# Patient Record
Sex: MALE | Race: WHITE | Employment: STUDENT | URBAN - METROPOLITAN AREA
[De-identification: names, ages, dates, MRNs, and addresses within clinical notes are randomized per-mention and may not be internally consistent; named-entity substitution may affect disease eponyms.]

---

## 2024-02-09 ENCOUNTER — OFFICE VISIT (OUTPATIENT)
Dept: URGENT CARE | Age: 24
End: 2024-02-09
Payer: COMMERCIAL

## 2024-02-09 ENCOUNTER — HOSPITAL ENCOUNTER (OUTPATIENT)
Facility: HOSPITAL | Age: 24
Setting detail: OBSERVATION
Discharge: HOME | End: 2024-02-10
Attending: EMERGENCY MEDICINE | Admitting: INTERNAL MEDICINE
Payer: COMMERCIAL

## 2024-02-09 VITALS
TEMPERATURE: 98.5 F | HEART RATE: 130 BPM | WEIGHT: 196 LBS | OXYGEN SATURATION: 98 % | DIASTOLIC BLOOD PRESSURE: 110 MMHG | SYSTOLIC BLOOD PRESSURE: 154 MMHG | RESPIRATION RATE: 20 BRPM

## 2024-02-09 DIAGNOSIS — F10.930 ALCOHOL WITHDRAWAL SYNDROME WITHOUT COMPLICATION (HCC): Primary | ICD-10-CM

## 2024-02-09 DIAGNOSIS — F10.939 ALCOHOL WITHDRAWAL (HCC): Primary | ICD-10-CM

## 2024-02-09 DIAGNOSIS — F10.930 ALCOHOL WITHDRAWAL SYNDROME WITHOUT COMPLICATION (HCC): ICD-10-CM

## 2024-02-09 LAB
ALBUMIN SERPL BCP-MCNC: 5.5 G/DL (ref 3.5–5)
ALP SERPL-CCNC: 101 U/L (ref 34–104)
ALT SERPL W P-5'-P-CCNC: 64 U/L (ref 7–52)
ANION GAP SERPL CALCULATED.3IONS-SCNC: 13 MMOL/L
AST SERPL W P-5'-P-CCNC: 44 U/L (ref 13–39)
BASOPHILS # BLD AUTO: 0.03 THOUSANDS/ÂΜL (ref 0–0.1)
BASOPHILS NFR BLD AUTO: 0 % (ref 0–1)
BILIRUB SERPL-MCNC: 0.95 MG/DL (ref 0.2–1)
BUN SERPL-MCNC: 6 MG/DL (ref 5–25)
CALCIUM SERPL-MCNC: 10.2 MG/DL (ref 8.4–10.2)
CHLORIDE SERPL-SCNC: 100 MMOL/L (ref 96–108)
CO2 SERPL-SCNC: 22 MMOL/L (ref 21–32)
CREAT SERPL-MCNC: 0.9 MG/DL (ref 0.6–1.3)
EOSINOPHIL # BLD AUTO: 0.01 THOUSAND/ÂΜL (ref 0–0.61)
EOSINOPHIL NFR BLD AUTO: 0 % (ref 0–6)
ERYTHROCYTE [DISTWIDTH] IN BLOOD BY AUTOMATED COUNT: 11.7 % (ref 11.6–15.1)
GFR SERPL CREATININE-BSD FRML MDRD: 120 ML/MIN/1.73SQ M
GLUCOSE SERPL-MCNC: 109 MG/DL (ref 65–140)
HCT VFR BLD AUTO: 47.5 % (ref 36.5–49.3)
HGB BLD-MCNC: 16.7 G/DL (ref 12–17)
IMM GRANULOCYTES # BLD AUTO: 0.03 THOUSAND/UL (ref 0–0.2)
IMM GRANULOCYTES NFR BLD AUTO: 0 % (ref 0–2)
LIPASE SERPL-CCNC: 17 U/L (ref 11–82)
LYMPHOCYTES # BLD AUTO: 1.04 THOUSANDS/ÂΜL (ref 0.6–4.47)
LYMPHOCYTES NFR BLD AUTO: 11 % (ref 14–44)
MCH RBC QN AUTO: 33.3 PG (ref 26.8–34.3)
MCHC RBC AUTO-ENTMCNC: 35.2 G/DL (ref 31.4–37.4)
MCV RBC AUTO: 95 FL (ref 82–98)
MONOCYTES # BLD AUTO: 0.82 THOUSAND/ÂΜL (ref 0.17–1.22)
MONOCYTES NFR BLD AUTO: 9 % (ref 4–12)
NEUTROPHILS # BLD AUTO: 7.46 THOUSANDS/ÂΜL (ref 1.85–7.62)
NEUTS SEG NFR BLD AUTO: 80 % (ref 43–75)
NRBC BLD AUTO-RTO: 0 /100 WBCS
PLATELET # BLD AUTO: 344 THOUSANDS/UL (ref 149–390)
PMV BLD AUTO: 8.8 FL (ref 8.9–12.7)
POTASSIUM SERPL-SCNC: 3.5 MMOL/L (ref 3.5–5.3)
PROT SERPL-MCNC: 8.5 G/DL (ref 6.4–8.4)
RBC # BLD AUTO: 5.02 MILLION/UL (ref 3.88–5.62)
SODIUM SERPL-SCNC: 135 MMOL/L (ref 135–147)
WBC # BLD AUTO: 9.39 THOUSAND/UL (ref 4.31–10.16)

## 2024-02-09 PROCEDURE — 99285 EMERGENCY DEPT VISIT HI MDM: CPT | Performed by: EMERGENCY MEDICINE

## 2024-02-09 PROCEDURE — 96365 THER/PROPH/DIAG IV INF INIT: CPT

## 2024-02-09 PROCEDURE — 83690 ASSAY OF LIPASE: CPT | Performed by: EMERGENCY MEDICINE

## 2024-02-09 PROCEDURE — 99284 EMERGENCY DEPT VISIT MOD MDM: CPT

## 2024-02-09 PROCEDURE — 93005 ELECTROCARDIOGRAM TRACING: CPT

## 2024-02-09 PROCEDURE — 36415 COLL VENOUS BLD VENIPUNCTURE: CPT

## 2024-02-09 PROCEDURE — 99213 OFFICE O/P EST LOW 20 MIN: CPT

## 2024-02-09 PROCEDURE — 85025 COMPLETE CBC W/AUTO DIFF WBC: CPT | Performed by: EMERGENCY MEDICINE

## 2024-02-09 PROCEDURE — 80053 COMPREHEN METABOLIC PANEL: CPT | Performed by: EMERGENCY MEDICINE

## 2024-02-09 RX ORDER — DIAZEPAM 5 MG/1
10 TABLET ORAL ONCE
Status: COMPLETED | OUTPATIENT
Start: 2024-02-09 | End: 2024-02-09

## 2024-02-09 RX ORDER — PHENOBARBITAL SODIUM 65 MG/ML
130 INJECTION, SOLUTION INTRAMUSCULAR; INTRAVENOUS ONCE
Status: COMPLETED | OUTPATIENT
Start: 2024-02-10 | End: 2024-02-10

## 2024-02-09 RX ADMIN — PHENOBARBITAL SODIUM 260 MG: 65 INJECTION INTRAMUSCULAR at 23:06

## 2024-02-09 RX ADMIN — DIAZEPAM 10 MG: 5 TABLET ORAL at 23:07

## 2024-02-10 VITALS
RESPIRATION RATE: 16 BRPM | OXYGEN SATURATION: 97 % | DIASTOLIC BLOOD PRESSURE: 85 MMHG | HEART RATE: 70 BPM | TEMPERATURE: 97.9 F | SYSTOLIC BLOOD PRESSURE: 134 MMHG

## 2024-02-10 PROBLEM — R74.01 TRANSAMINITIS: Status: ACTIVE | Noted: 2024-02-10

## 2024-02-10 PROBLEM — F10.939 ALCOHOL WITHDRAWAL (HCC): Status: ACTIVE | Noted: 2024-02-10

## 2024-02-10 PROBLEM — R74.01 TRANSAMINITIS: Status: RESOLVED | Noted: 2024-02-10 | Resolved: 2024-02-10

## 2024-02-10 PROBLEM — F10.939 ALCOHOL WITHDRAWAL (HCC): Status: RESOLVED | Noted: 2024-02-10 | Resolved: 2024-02-10

## 2024-02-10 LAB
ALBUMIN SERPL BCP-MCNC: 4.4 G/DL (ref 3.5–5)
ALP SERPL-CCNC: 79 U/L (ref 34–104)
ALT SERPL W P-5'-P-CCNC: 48 U/L (ref 7–52)
ANION GAP SERPL CALCULATED.3IONS-SCNC: 8 MMOL/L
AST SERPL W P-5'-P-CCNC: 34 U/L (ref 13–39)
BASOPHILS # BLD AUTO: 0.03 THOUSANDS/ÂΜL (ref 0–0.1)
BASOPHILS NFR BLD AUTO: 0 % (ref 0–1)
BILIRUB SERPL-MCNC: 1.22 MG/DL (ref 0.2–1)
BUN SERPL-MCNC: 5 MG/DL (ref 5–25)
CALCIUM SERPL-MCNC: 8.8 MG/DL (ref 8.4–10.2)
CHLORIDE SERPL-SCNC: 101 MMOL/L (ref 96–108)
CO2 SERPL-SCNC: 27 MMOL/L (ref 21–32)
CREAT SERPL-MCNC: 0.88 MG/DL (ref 0.6–1.3)
EOSINOPHIL # BLD AUTO: 0.04 THOUSAND/ÂΜL (ref 0–0.61)
EOSINOPHIL NFR BLD AUTO: 1 % (ref 0–6)
ERYTHROCYTE [DISTWIDTH] IN BLOOD BY AUTOMATED COUNT: 11.8 % (ref 11.6–15.1)
GFR SERPL CREATININE-BSD FRML MDRD: 121 ML/MIN/1.73SQ M
GLUCOSE P FAST SERPL-MCNC: 97 MG/DL (ref 65–99)
GLUCOSE SERPL-MCNC: 97 MG/DL (ref 65–140)
HAV IGM SER QL: NORMAL
HBV CORE IGM SER QL: NORMAL
HBV SURFACE AG SER QL: NORMAL
HCT VFR BLD AUTO: 42.4 % (ref 36.5–49.3)
HCV AB SER QL: NORMAL
HGB BLD-MCNC: 14.4 G/DL (ref 12–17)
IMM GRANULOCYTES # BLD AUTO: 0.02 THOUSAND/UL (ref 0–0.2)
IMM GRANULOCYTES NFR BLD AUTO: 0 % (ref 0–2)
LYMPHOCYTES # BLD AUTO: 1.84 THOUSANDS/ÂΜL (ref 0.6–4.47)
LYMPHOCYTES NFR BLD AUTO: 27 % (ref 14–44)
MCH RBC QN AUTO: 32.7 PG (ref 26.8–34.3)
MCHC RBC AUTO-ENTMCNC: 34 G/DL (ref 31.4–37.4)
MCV RBC AUTO: 96 FL (ref 82–98)
MONOCYTES # BLD AUTO: 0.87 THOUSAND/ÂΜL (ref 0.17–1.22)
MONOCYTES NFR BLD AUTO: 13 % (ref 4–12)
NEUTROPHILS # BLD AUTO: 4.15 THOUSANDS/ÂΜL (ref 1.85–7.62)
NEUTS SEG NFR BLD AUTO: 59 % (ref 43–75)
NRBC BLD AUTO-RTO: 0 /100 WBCS
PLATELET # BLD AUTO: 290 THOUSANDS/UL (ref 149–390)
PMV BLD AUTO: 9 FL (ref 8.9–12.7)
POTASSIUM SERPL-SCNC: 3.4 MMOL/L (ref 3.5–5.3)
PROT SERPL-MCNC: 6.6 G/DL (ref 6.4–8.4)
RBC # BLD AUTO: 4.41 MILLION/UL (ref 3.88–5.62)
SODIUM SERPL-SCNC: 136 MMOL/L (ref 135–147)
WBC # BLD AUTO: 6.95 THOUSAND/UL (ref 4.31–10.16)

## 2024-02-10 PROCEDURE — 80053 COMPREHEN METABOLIC PANEL: CPT | Performed by: PHYSICIAN ASSISTANT

## 2024-02-10 PROCEDURE — 85025 COMPLETE CBC W/AUTO DIFF WBC: CPT | Performed by: PHYSICIAN ASSISTANT

## 2024-02-10 PROCEDURE — 80074 ACUTE HEPATITIS PANEL: CPT | Performed by: PHYSICIAN ASSISTANT

## 2024-02-10 PROCEDURE — 96376 TX/PRO/DX INJ SAME DRUG ADON: CPT

## 2024-02-10 PROCEDURE — 99236 HOSP IP/OBS SAME DATE HI 85: CPT | Performed by: FAMILY MEDICINE

## 2024-02-10 RX ORDER — CHLORDIAZEPOXIDE HYDROCHLORIDE 5 MG/1
CAPSULE, GELATIN COATED ORAL 3 TIMES DAILY PRN
Qty: 6 CAPSULE | Refills: 0 | Status: SHIPPED | OUTPATIENT
Start: 2024-02-10 | End: 2024-02-10

## 2024-02-10 RX ORDER — LANOLIN ALCOHOL/MO/W.PET/CERES
100 CREAM (GRAM) TOPICAL DAILY
Status: DISCONTINUED | OUTPATIENT
Start: 2024-02-10 | End: 2024-02-10 | Stop reason: HOSPADM

## 2024-02-10 RX ORDER — SODIUM CHLORIDE, SODIUM GLUCONATE, SODIUM ACETATE, POTASSIUM CHLORIDE, MAGNESIUM CHLORIDE, SODIUM PHOSPHATE, DIBASIC, AND POTASSIUM PHOSPHATE .53; .5; .37; .037; .03; .012; .00082 G/100ML; G/100ML; G/100ML; G/100ML; G/100ML; G/100ML; G/100ML
100 INJECTION, SOLUTION INTRAVENOUS CONTINUOUS
Status: DISCONTINUED | OUTPATIENT
Start: 2024-02-10 | End: 2024-02-10 | Stop reason: HOSPADM

## 2024-02-10 RX ORDER — LANOLIN ALCOHOL/MO/W.PET/CERES
100 CREAM (GRAM) TOPICAL DAILY
COMMUNITY
Start: 2024-02-11 | End: 2024-02-25

## 2024-02-10 RX ORDER — POTASSIUM CHLORIDE 20 MEQ/1
40 TABLET, EXTENDED RELEASE ORAL ONCE
Status: COMPLETED | OUTPATIENT
Start: 2024-02-10 | End: 2024-02-10

## 2024-02-10 RX ORDER — ONDANSETRON 2 MG/ML
4 INJECTION INTRAMUSCULAR; INTRAVENOUS EVERY 6 HOURS PRN
Status: DISCONTINUED | OUTPATIENT
Start: 2024-02-10 | End: 2024-02-10 | Stop reason: HOSPADM

## 2024-02-10 RX ORDER — ACETAMINOPHEN 325 MG/1
650 TABLET ORAL EVERY 6 HOURS PRN
Status: DISCONTINUED | OUTPATIENT
Start: 2024-02-10 | End: 2024-02-10 | Stop reason: HOSPADM

## 2024-02-10 RX ORDER — FOLIC ACID 1 MG/1
1 TABLET ORAL DAILY
Status: DISCONTINUED | OUTPATIENT
Start: 2024-02-10 | End: 2024-02-10 | Stop reason: HOSPADM

## 2024-02-10 RX ORDER — MULTIVITAMIN
1 CAPSULE ORAL DAILY
Start: 2024-02-10

## 2024-02-10 RX ORDER — CHLORDIAZEPOXIDE HYDROCHLORIDE 5 MG/1
CAPSULE, GELATIN COATED ORAL 3 TIMES DAILY PRN
Qty: 6 CAPSULE | Refills: 0 | Status: SHIPPED | OUTPATIENT
Start: 2024-02-10 | End: 2024-02-16 | Stop reason: ALTCHOICE

## 2024-02-10 RX ORDER — FOLIC ACID 1 MG/1
1 TABLET ORAL DAILY
COMMUNITY
Start: 2024-02-11 | End: 2024-02-25

## 2024-02-10 RX ADMIN — MULTIPLE VITAMINS W/ MINERALS TAB 1 TABLET: TAB ORAL at 08:29

## 2024-02-10 RX ADMIN — Medication 100 MG: at 08:29

## 2024-02-10 RX ADMIN — POTASSIUM CHLORIDE 40 MEQ: 1500 TABLET, EXTENDED RELEASE ORAL at 11:31

## 2024-02-10 RX ADMIN — SODIUM CHLORIDE, SODIUM GLUCONATE, SODIUM ACETATE, POTASSIUM CHLORIDE, MAGNESIUM CHLORIDE, SODIUM PHOSPHATE, DIBASIC, AND POTASSIUM PHOSPHATE 100 ML/HR: .53; .5; .37; .037; .03; .012; .00082 INJECTION, SOLUTION INTRAVENOUS at 02:02

## 2024-02-10 RX ADMIN — SODIUM CHLORIDE 1000 ML: 0.9 INJECTION, SOLUTION INTRAVENOUS at 00:21

## 2024-02-10 RX ADMIN — PHENOBARBITAL SODIUM 130 MG: 65 INJECTION INTRAMUSCULAR at 00:24

## 2024-02-10 RX ADMIN — FOLIC ACID 1 MG: 1 TABLET ORAL at 08:29

## 2024-02-10 NOTE — ASSESSMENT & PLAN NOTE
Mild transaminitis possibly 2/2 alcohol abuse   AST 44, ALT 64  Patient endorses intermittent RUQ abdominal pain x 3 days   Check RUQ US   Obtain hepatitis panel   Trend LFTs

## 2024-02-10 NOTE — ASSESSMENT & PLAN NOTE
Patient presents with tremors, HA, diaphoresis, anxiety after drinking 5 shots of liquor daily for the past 6-7 months   Last alcoholic beverage was 02/07  No prior hx of alcohol withdrawal or seizures   Noted to have tachycardia and hypertension in the ED -- now improving s/p Valium and Phenobarbital in the ED   Symptoms significantly improved.  Continue with the vitamin supplements as an outpatient.  Will do a short course of Librium as well on discharge.  Patient seems to be doing okay.  He said he will follow-up as an outpatient for rehab if he feels it is necessary

## 2024-02-10 NOTE — PROGRESS NOTES
[facility]        NAME: Dagoberto Uribe is a 23 y.o. male  : 2000    MRN: [id number]  DATE: 2024  TIME: 9:16 PM    Assessment and Plan   Alcohol withdrawal syndrome without complication (HCC) [F10.930]  1. Alcohol withdrawal syndrome without complication (HCC)  Transfer to other facility            Patient Instructions       Follow up with PCP in 3-5 days.  Proceed to  ER if symptoms worsen.    Chief Complaint     Chief Complaint   Patient presents with    Abdominal Pain     Abdominal pain RUQ and los of appetite started Tuesday. Patient states he is going through a withdrawal of alcohol Wednesday.          History of Present Illness       HPI    Review of Systems   Review of Systems      Current Medications     No current outpatient medications on file.    Current Allergies     Allergies as of 2024 - Reviewed 2024   Allergen Reaction Noted    Amoxicillin Hives 2024            The following portions of the patient's history were reviewed and updated as appropriate: allergies, current medications, past family history, past medical history, past social history, past surgical history and problem list.     History reviewed. No pertinent past medical history.    History reviewed. No pertinent surgical history.    History reviewed. No pertinent family history.      Medications have been verified.        Objective   BP (!) 154/110 (BP Location: Right arm) Comment (BP Location): manual  Pulse (!) 130   Temp 98.5 °F (36.9 °C) (Temporal)   Resp 20   Wt 88.9 kg (196 lb)   SpO2 98%        Physical Exam     Physical Exam                 list of the medicines, vitamins, and herbs you take. Include the amounts, and when and why you take them. Bring the list or the pill bottles to follow-up visits. Carry your medicine list with you in case of an emergency.     Have someone stay with you during withdrawal:  This person should help you take your medicine and keep you in a calm, quiet environment. He or she should also watch your symptoms and know what to do if your symptoms get worse.  Learn to stop drinking alcohol safely:  Work with your healthcare provider to develop a plan for you to stop drinking safely. A sudden stop or change can be life-threatening.  For support and more information:   Alcoholics Anonymous  Web Address: http://www.aa.org     Substance Abuse and Mental Health Services Administration  [address]  [place] [address]  Web Address: http://www.[email or web address].gov  Follow up with your healthcare provider within 1 day:  Write down your questions so you remember to ask them during your visits.  © Copyright [employer] 2023 Information is for End User's use only and may not be sold, redistributed or otherwise used for commercial purposes.  The above information is an  only. It is not intended as medical advice for individual conditions or treatments. Talk to your doctor, nurse or pharmacist before following any medical regimen to see if it is safe and effective for you.       Follow up with PCP in 3-5 days.  Proceed to  ER if symptoms worsen.    Chief Complaint     Chief Complaint   Patient presents with    Abdominal Pain     Abdominal pain RUQ and los of appetite started Tuesday. Patient states he is going through a withdrawal of alcohol Wednesday.          History of Present Illness       23 year old male with no significant PMH presents for evaluation of RUQ pain, tremors. He reports that he has been binge drinking over the past year, consuming approximately 5 mixed drinks per night. He woke up 2 days ago with RUQ discomfort  and discontinued drinking since; he has noticed fine tremors of the hands over the past 24 hours. He denies vomiting, fever, palpitations, hallucinations, seizure activity. Last drink was 2/07/24.         Review of Systems   Review of Systems   Constitutional:  Negative for fatigue and fever.   HENT:  Negative for congestion, ear discharge, ear pain, postnasal drip, rhinorrhea, sinus pressure, sinus pain, sneezing and sore throat.    Eyes: Negative.  Negative for pain, discharge, redness and itching.   Respiratory: Negative.  Negative for apnea, cough, choking, chest tightness, shortness of breath, wheezing and stridor.    Cardiovascular: Negative.  Negative for chest pain and palpitations.   Gastrointestinal:  Positive for abdominal pain. Negative for abdominal distention, anal bleeding, blood in stool, constipation, diarrhea, nausea, rectal pain and vomiting.   Endocrine: Negative.  Negative for polydipsia, polyphagia and polyuria.   Genitourinary: Negative.  Negative for decreased urine volume and flank pain.   Musculoskeletal: Negative.  Negative for arthralgias, back pain, myalgias, neck pain and neck stiffness.   Skin: Negative.  Negative for color change and rash.   Allergic/Immunologic: Negative.  Negative for environmental allergies.   Neurological:  Positive for tremors. Negative for dizziness, facial asymmetry, light-headedness, numbness and headaches.   Hematological: Negative.  Negative for adenopathy.   Psychiatric/Behavioral: Negative.           Current Medications     No current outpatient medications on file.    Current Allergies     Allergies as of 02/09/2024 - Reviewed 02/09/2024   Allergen Reaction Noted    Amoxicillin Hives 02/09/2024            The following portions of the patient's history were reviewed and updated as appropriate: allergies, current medications, past family history, past medical history, past social history, past surgical history and problem list.     History reviewed. No  pertinent past medical history.    History reviewed. No pertinent surgical history.    History reviewed. No pertinent family history.      Medications have been verified.        Objective   BP (!) 154/110 (BP Location: Right arm) Comment (BP Location): manual  Pulse (!) 130   Temp 98.5 °F (36.9 °C) (Temporal)   Resp 20   Wt 88.9 kg (196 lb)   SpO2 98%        Physical Exam     Physical Exam  Vitals reviewed.   Constitutional:       General: He is not in acute distress.     Appearance: Normal appearance. He is not ill-appearing, toxic-appearing or diaphoretic.      Interventions: He is not intubated.  HENT:      Head: Normocephalic and atraumatic.      Right Ear: Tympanic membrane, ear canal and external ear normal. There is no impacted cerumen.      Left Ear: Tympanic membrane, ear canal and external ear normal. There is no impacted cerumen.      Nose: Nose normal. No congestion or rhinorrhea.      Mouth/Throat:      Mouth: Mucous membranes are moist.      Pharynx: Oropharynx is clear. Uvula midline. No pharyngeal swelling, oropharyngeal exudate, posterior oropharyngeal erythema or uvula swelling.      Tonsils: No tonsillar exudate or tonsillar abscesses. 1+ on the right. 1+ on the left.   Eyes:      Extraocular Movements: Extraocular movements intact.      Conjunctiva/sclera: Conjunctivae normal.      Pupils: Pupils are equal, round, and reactive to light.      Comments: Anicteric   Cardiovascular:      Rate and Rhythm: Normal rate and regular rhythm.      Pulses: Normal pulses.      Heart sounds: Normal heart sounds, S1 normal and S2 normal. Heart sounds not distant. No murmur heard.     No friction rub. No gallop.   Pulmonary:      Effort: Pulmonary effort is normal. No tachypnea, bradypnea, accessory muscle usage, prolonged expiration, respiratory distress or retractions. He is not intubated.      Breath sounds: Normal breath sounds. No stridor, decreased air movement or transmitted upper airway sounds. No  decreased breath sounds, wheezing, rhonchi or rales.   Chest:      Chest wall: No tenderness.   Abdominal:      General: Abdomen is flat.      Palpations: Abdomen is soft.      Tenderness: There is no abdominal tenderness. There is no right CVA tenderness or left CVA tenderness.   Musculoskeletal:         General: Normal range of motion.      Cervical back: Normal range of motion and neck supple. No rigidity or tenderness.   Lymphadenopathy:      Cervical: No cervical adenopathy.   Skin:     General: Skin is warm and dry.      Capillary Refill: Capillary refill takes less than 2 seconds.      Findings: No erythema.   Neurological:      General: No focal deficit present.      Mental Status: He is alert.      GCS: GCS eye subscore is 4. GCS verbal subscore is 5. GCS motor subscore is 6.      Cranial Nerves: Cranial nerves 2-12 are intact.      Sensory: Sensation is intact.      Motor: Tremor present. No weakness, atrophy, abnormal muscle tone, seizure activity or pronator drift.      Coordination: Coordination is intact.      Gait: Gait is intact.      Comments: Fine intention tremor of hands.    Psychiatric:         Mood and Affect: Mood normal.

## 2024-02-10 NOTE — ED ATTENDING ATTESTATION
"2/9/2024  I, Peter Malik MD, saw and evaluated the patient. I have discussed the patient with the resident/non-physician practitioner and agree with the resident's/non-physician practitioner's findings, Plan of Care, and MDM as documented in the resident's/non-physician practitioner's note, except where noted. All available labs and Radiology studies were reviewed.  I was present for key portions of any procedure(s) performed by the resident/non-physician practitioner and I was immediately available to provide assistance.    At this point I agree with the current assessment done in the Emergency Department.  I have conducted an independent evaluation of this patient a history and physical is as follows:    This is a 23 y.o. old male who presents to the ED for evaluation of alcohol withdrawal. Last drink night of 2/7/23. Drinking \"5 vodka drinks 5x/wk\". Today shakey, sweaty, lightheaded, palpitations. He is cutting back on alcohol intentionally, has been on a heavy \"dumont\" no seizure activity.     VS and nursing notes reviewed  General: Appears in moderate distress, awake, alert, speaking normally in full sentences. Well-nourished, well-developed. Appears stated age.   Head: Normocephalic, atraumatic.  Eyes: EOMI. Vision grossly normal. No subconjunctival hemorrhages or occular discharge noted. Symmetrical lids. Pupils dilated.  ENT: Atraumatic external nose and ears. No stridor. Normal phonation. No drooling. Normal swallowing.   Neck: No JVD. FROM. No goiter.  CV: No pallor. Tachycardic rate.  Lungs: No tachypnea. No respiratory distress.  MSK: Moving all extremities equally, no peripheral edema  Skin: Dry, intact. No cyanosis. Skin Tachy.  Neuro: Awake, alert, GCS15. CN II-XII grossly intact. Grossly normal gait. Tremor x4  Psychiatric/Behavioral: Appropriate mood and affect.  Mildly anxious    A/P: This is a 23 y.o. male who presents to the ED for evaluation of alcohol withdrawal. Needs phenobarb, labs. Will " check SEWS Score. Dose based on score. Score is 7, so will start with PO Valium 10mg, Pheboarb IV 260mg. Reeval in 30 min. Willing to talk to catch, doesn't wish for long IP detox. Will consult crisis for warm handoff. Monitor closely, anticipate admission.    ED Course       Critical Care Time  CriticalCare Time    Date/Time: 2/10/2024 1:26 AM    Performed by: Peter Malik MD  Authorized by: Peter Malik MD    Critical care provider statement:     Critical care time (minutes):  31    Critical care time was exclusive of:  Separately billable procedures and treating other patients and teaching time    Critical care was necessary to treat or prevent imminent or life-threatening deterioration of the following conditions:  Toxidrome (Alcohol withdrawal SYndrome)    Critical care was time spent personally by me on the following activities:  Obtaining history from patient or surrogate, development of treatment plan with patient or surrogate, evaluation of patient's response to treatment, examination of patient, review of old charts, re-evaluation of patient's condition, ordering and review of laboratory studies, ordering and performing treatments and interventions and ordering and review of radiographic studies

## 2024-02-10 NOTE — ASSESSMENT & PLAN NOTE
Mild transaminitis possibly 2/2 alcohol abuse   AST 44, ALT 64  Patient endorses intermittent RUQ abdominal pain x 3 days   Mild right upper quadrant pain.  Likely secondary drinking.  The AST and ALT were minimally elevated on admission but that did resolve.

## 2024-02-10 NOTE — ED NOTES
Pt reports drinking daily for 6-7 months at 4-5 shots of liquor a day. Prior he states he was only drinking socially at [employer]. No hx of withdrawal. CIWA 6. +tremor +tingling +anxiety      Yuridia Sepulveda RN  02/09/24 0319

## 2024-02-10 NOTE — ASSESSMENT & PLAN NOTE
Patient presents with tremors, HA, diaphoresis, anxiety after drinking 5 shots of liquor daily for the past 6-7 months   Last alcoholic beverage was 02/07  No prior hx of alcohol withdrawal or seizures   Noted to have tachycardia and hypertension in the ED -- now improving s/p Valium and Phenobarbital in the ED   IVF  CIWA protocol   Multivitamin, thiamine, folic acid   Monitor telemetry   Encourage cessation

## 2024-02-10 NOTE — PLAN OF CARE
Problem: PAIN - ADULT  Goal: Verbalizes/displays adequate comfort level or baseline comfort level  Description: Interventions:  - Encourage patient to monitor pain and request assistance  - Assess pain using appropriate pain scale  - Administer analgesics based on type and severity of pain and evaluate response  - Implement non-pharmacological measures as appropriate and evaluate response  - Consider cultural and social influences on pain and pain management  - Notify physician/advanced practitioner if interventions unsuccessful or patient reports new pain  Outcome: Adequate for Discharge     Problem: INFECTION - ADULT  Goal: Absence or prevention of progression during hospitalization  Description: INTERVENTIONS:  - Assess and monitor for signs and symptoms of infection  - Monitor lab/diagnostic results  - Monitor all insertion sites, i.e. indwelling lines, tubes, and drains  - Monitor endotracheal if appropriate and nasal secretions for changes in amount and color  - [place] appropriate cooling/warming therapies per order  - Administer medications as ordered  - Instruct and encourage patient and family to use good hand hygiene technique  - Identify and instruct in appropriate isolation precautions for identified infection/condition  Outcome: Adequate for Discharge  Goal: Absence of fever/infection during neutropenic period  Description: INTERVENTIONS:  - Monitor WBC    Outcome: Adequate for Discharge     Problem: SAFETY ADULT  Goal: Patient will remain free of falls  Description: INTERVENTIONS:  - Educate patient/family on patient safety including physical limitations  - Instruct patient to call for assistance with activity   - Consult OT/PT to assist with strengthening/mobility   - Keep Call bell within reach  - Keep bed low and locked with side rails adjusted as appropriate  - Keep care items and personal belongings within reach  - Initiate and maintain comfort rounds  - Make Fall Risk Sign visible to  staff  - Apply yellow socks and bracelet for high fall risk patients  - Consider moving patient to room near nurses station  Outcome: Adequate for Discharge  Goal: Maintain or return to baseline ADL function  Description: INTERVENTIONS:  -  Assess patient's ability to carry out ADLs; assess patient's baseline for ADL function and identify physical deficits which impact ability to perform ADLs (bathing, care of mouth/teeth, toileting, grooming, dressing, etc.)  - Assess/evaluate cause of self-care deficits   - Assess range of motion  - Assess patient's mobility; develop plan if impaired  - Assess patient's need for assistive devices and provide as appropriate  - Encourage maximum independence but intervene and supervise when necessary  - Involve family in performance of ADLs  - Assess for home care needs following discharge   - Consider OT consult to assist with ADL evaluation and planning for discharge  - Provide patient education as appropriate  Outcome: Adequate for Discharge  Goal: Maintains/Returns to pre admission functional level  Description: INTERVENTIONS:  - Perform AM-PAC 6 Click Basic Mobility/ Daily Activity assessment daily.  - Set and communicate daily mobility goal to care team and patient/family/caregiver.   - Collaborate with rehabilitation services on mobility goals if consulted  - Out of bed for toileting  - Record patient progress and toleration of activity level   Outcome: Adequate for Discharge

## 2024-02-10 NOTE — ED PROVIDER NOTES
History  Chief Complaint   Patient presents with    Withdrawal - Alcohol     Stopped drinking Wednesday. 3-4 liquor a day prior. No hx of prior. +tremors +abdominal pain +nausea      The pt is a 23 year old male who presents to ED for evaluation of alcohol withdrawals. He states he has been on a 6 month dumont of drinking about 5 vodka drinks per day about 5 times per week. He states his last drink was the night of 2/7 and today he developed tremors and shakiness, palpitations and feeling of heart racing, sweating, and lightheadedness. He also noted some intermittent abdominal pain and nausea. He was seen by urgent care and referred to ED for treatment of alcohol withdrawal. Pt denies hallucinations, seizures, prior alcohol detox events, chest pain, SOB, recent falls, other drug use        None       No past medical history on file.    No past surgical history on file.    No family history on file.  I have reviewed and agree with the history as documented.    E-Cigarette/Vaping     E-Cigarette/Vaping Substances     Social History     Tobacco Use    Smoking status: Never    Smokeless tobacco: Former   Substance Use Topics    Alcohol use: Yes     Alcohol/week: 4.0 standard drinks of alcohol     Types: 4 Shots of liquor per week    Drug use: Not Currently        Review of Systems   Constitutional:  Positive for diaphoresis.   HENT:  Negative for congestion and sore throat.    Eyes:  Negative for visual disturbance.   Respiratory:  Negative for cough and shortness of breath.    Cardiovascular:  Negative for chest pain.   Gastrointestinal:  Positive for abdominal pain and nausea. Negative for diarrhea and vomiting.   Genitourinary:  Negative for difficulty urinating and dysuria.   Neurological:  Positive for dizziness and tremors.   Psychiatric/Behavioral:  Negative for agitation, confusion, hallucinations, self-injury and suicidal ideas. The patient is not nervous/anxious.    All other systems reviewed and are  negative.      Physical Exam  ED Triage Vitals [02/09/24 2147]   Temp Pulse Respirations Blood Pressure SpO2   -- (!) 125 18 (!) 173/100 97 %      Temp src Heart Rate Source Patient Position - Orthostatic VS BP Location FiO2 (%)   -- Monitor Lying Right arm --      Pain Score       --             Orthostatic Vital Signs  Vitals:    02/09/24 2343 02/10/24 0013 02/10/24 0022 02/10/24 0100   BP: 141/77 136/86 139/78 128/69   Pulse: 97 92 95 82   Patient Position - Orthostatic VS:           Physical Exam  Vitals and nursing note reviewed.   Constitutional:       Appearance: Normal appearance. He is diaphoretic.   HENT:      Head: Normocephalic and atraumatic.      Nose: Nose normal.      Mouth/Throat:      Mouth: Mucous membranes are moist.      Pharynx: Oropharynx is clear.   Eyes:      Extraocular Movements: Extraocular movements intact.      Conjunctiva/sclera: Conjunctivae normal.      Pupils: Pupils are equal, round, and reactive to light.      Comments: Pupils dilated bilaterally but reactive   Cardiovascular:      Rate and Rhythm: Regular rhythm. Tachycardia present.      Pulses: Normal pulses.      Heart sounds: Normal heart sounds.   Pulmonary:      Effort: Pulmonary effort is normal. No respiratory distress.      Breath sounds: Normal breath sounds. No stridor. No wheezing, rhonchi or rales.   Chest:      Chest wall: No tenderness.   Abdominal:      General: Abdomen is flat. Bowel sounds are normal.      Palpations: Abdomen is soft.      Tenderness: There is no abdominal tenderness. There is no right CVA tenderness, left CVA tenderness, guarding or rebound.   Musculoskeletal:         General: No swelling, deformity or signs of injury. Normal range of motion.      Cervical back: Normal range of motion and neck supple.      Right lower leg: No edema.      Left lower leg: No edema.   Skin:     General: Skin is warm and moist.      Comments: Sweating to the forehead and palms   Neurological:      General: No focal  deficit present.      Mental Status: He is alert and oriented to person, place, and time.      GCS: GCS eye subscore is 4. GCS verbal subscore is 5. GCS motor subscore is 6.      Comments: Tremulous   Psychiatric:         Mood and Affect: Mood normal.         Behavior: Behavior normal.         Thought Content: Thought content normal.         Judgment: Judgment normal.      Comments: No visual, auditory or tactile hallucinations         ED Medications  Medications   diazepam (VALIUM) tablet 10 mg (10 mg Oral Given 2/9/24 2307)   PHENobarbital 260 mg in sodium chloride 0.9 % 100 mL IVPB (0 mg Intravenous Stopped 2/9/24 2336)   PHENobarbital injection 130 mg (130 mg Intravenous Given 2/10/24 0024)   sodium chloride 0.9 % bolus 1,000 mL (1,000 mL Intravenous New Bag 2/10/24 0021)       Diagnostic Studies  Results Reviewed       Procedure Component Value Units Date/Time    Comprehensive metabolic panel [[id number]]  (Abnormal) Collected: 02/09/24 2158    Lab Status: Final result Specimen: Blood from Arm, Left Updated: 02/09/24 2221     Sodium 135 mmol/L      Potassium 3.5 mmol/L      Chloride 100 mmol/L      CO2 22 mmol/L      ANION GAP 13 mmol/L      BUN 6 mg/dL      Creatinine 0.90 mg/dL      Glucose 109 mg/dL      Calcium 10.2 mg/dL      AST 44 U/L      ALT 64 U/L      Alkaline Phosphatase 101 U/L      Total Protein 8.5 g/dL      Albumin 5.5 g/dL      Total Bilirubin 0.95 mg/dL      eGFR 120 ml/min/1.73sq m     Narrative:      National Kidney Disease Foundation guidelines for Chronic Kidney Disease (CKD):     Stage 1 with normal or high GFR (GFR > 90 mL/min/1.73 square meters)    Stage 2 Mild CKD (GFR = 60-89 mL/min/1.73 square meters)    Stage 3A Moderate CKD (GFR = 45-59 mL/min/1.73 square meters)    Stage 3B Moderate CKD (GFR = 30-44 mL/min/1.73 square meters)    Stage 4 Severe CKD (GFR = 15-29 mL/min/1.73 square meters)    Stage 5 End Stage CKD (GFR <15 mL/min/1.73 square meters)  Note: GFR calculation is accurate  only with a steady state creatinine    Lipase [[id number]]  (Normal) Collected: 02/09/24 2158    Lab Status: Final result Specimen: Blood from Arm, Left Updated: 02/09/24 2221     Lipase 17 u/L     CBC and differential [[id number]]  (Abnormal) Collected: 02/09/24 2158    Lab Status: Final result Specimen: Blood from Arm, Left Updated: 02/09/24 2208     WBC 9.39 Thousand/uL      RBC 5.02 Million/uL      Hemoglobin 16.7 g/dL      Hematocrit 47.5 %      MCV 95 fL      MCH 33.3 pg      MCHC 35.2 g/dL      RDW 11.7 %      MPV 8.8 fL      Platelets 344 Thousands/uL      nRBC 0 /100 WBCs      Neutrophils Relative 80 %      Immat GRANS % 0 %      Lymphocytes Relative 11 %      Monocytes Relative 9 %      Eosinophils Relative 0 %      Basophils Relative 0 %      Neutrophils Absolute 7.46 Thousands/µL      Immature Grans Absolute 0.03 Thousand/uL      Lymphocytes Absolute 1.04 Thousands/µL      Monocytes Absolute 0.82 Thousand/µL      Eosinophils Absolute 0.01 Thousand/µL      Basophils Absolute 0.03 Thousands/µL                    No orders to display         Procedures  Procedures      ED Course  ED Course as of 02/10/24 0126   Fri Feb 09, 2024   2326 Lipase: 17   2326 CBC and differential(!)  Grossly normal   2327 Comprehensive metabolic panel(!)  Elevation of AST, ALT, protein. Otherwise grossly normal    2353 CIWA 2 currently   Sat Feb 10, 2024   0036 Discussed option of transfer to [facility], but pt declines transfer at this time. CIWA 2   0043 Accepted by [facility] to obs                                       Medical Decision Making  Ddx: alcohol withdrawal, delirium tremens, electrolyte abnormality    CMP with elevation of LFTs, electrolytes within normal limits. CBC grossly normal.   Pt stopped drinking night of 2/7  CIWA score on presentation of 7  No hallucinations, no agitation, no delirium  Pt and mom declined transfer to [facility].  Accepted for observation by [facility] service.   CIWA of 2 after  medication    Amount and/or Complexity of Data Reviewed  Independent Historian: parent  Labs: ordered. Decision-making details documented in ED Course.    Risk  Prescription drug management.          Disposition  Final diagnoses:   Alcohol withdrawal syndrome without complication (HCC)     Time reflects when diagnosis was documented in both MDM as applicable and the Disposition within this note       Time User Action Codes Description Comment    2/10/2024 12:41 AM Sarah Foster [F10.930] Alcohol withdrawal syndrome without complication (HCC)           ED Disposition       ED Disposition   Admit    Condition   Stable    Date/Time   Sat Feb 10, 2024 12:41 AM    Comment   Case was discussed with C[facility]  and the patient's admission status was agreed to be Admission Status: observation status to the service of Dr. Laura .               Follow-up Information    None         Patient's Medications    No medications on file     No discharge procedures on file.    PDMP Review       None             ED Provider  Attending physically available and evaluated Dagoberto Uribe. I managed the patient along with the ED Attending.    Electronically Signed by           Sarah Foster MD  02/10/24 0127

## 2024-02-10 NOTE — H&P
Formerly [facility], [facility]  H&P  Name: Dagoberto Uribe 23 y.o. male I MRN: [id number]  Unit/Bed#: S -01 I Date of Admission: 2/9/2024   Date of Service: 2/10/2024 I Hospital Day: 0      Assessment/Plan   * Alcohol withdrawal (HCC)  Assessment & Plan  Patient presents with tremors, HA, diaphoresis, anxiety after drinking 5 shots of liquor daily for the past 6-7 months   Last alcoholic beverage was 02/07  No prior hx of alcohol withdrawal or seizures   Noted to have tachycardia and hypertension in the ED -- now improving s/p Valium and Phenobarbital in the ED   IVF  CIWA protocol   Multivitamin, thiamine, folic acid   Monitor telemetry   Encourage cessation     Transaminitis  Assessment & Plan  Mild transaminitis possibly 2/2 alcohol abuse   AST 44, ALT 64  Patient endorses intermittent RUQ abdominal pain x 3 days   Check RUQ US   Obtain hepatitis panel   Trend LFTs                VTE Pharmacologic Prophylaxis: VTE Score: 1 Low Risk (Score 0-2) - Encourage Ambulation.  Code Status: Level 1 - Full Code   Discussion with family: Patient declined call to .     Anticipated Length of Stay: Patient will be admitted on an observation basis with an anticipated length of stay of less than 2 midnights secondary to alcohol withdrawal.    Total Time Spent on Date of Encounter in care of patient: 60 mins. This time was spent on one or more of the following: performing physical exam; counseling and coordination of care; obtaining or reviewing history; documenting in the medical record; reviewing/ordering tests, medications or procedures; communicating with other healthcare professionals and discussing with patient's family/caregivers.    Chief Complaint: tremors, HA, diaphoresis, anxiety, RUQ abdominal pain    History of Present Illness:  Dagoberto Uribe is a 23 y.o. male with no past medical history who presents with tremors, HA, diaphoresis, anxiety after drinking 5 shots of liquor daily for the past 6-7  months. Last alcoholic beverage was 02/07. Prior to this, patient would drink only occasionally/socially at school. No prior hx of alcohol withdrawal or seizures. Additionally, the patient endorses intermittent RUQ pain with no nausea/vomiting or fever/chills. He states this pain started around the time he stopped drinking. Endorses decreases appetite. No prior surgeries. Does not smoke or use illicit drugs.     Review of Systems:  Review of Systems   Constitutional:  Positive for appetite change and diaphoresis. Negative for chills and fever.   Eyes:  Negative for visual disturbance.   Respiratory:  Negative for shortness of breath.    Cardiovascular:  Negative for chest pain and palpitations.   Gastrointestinal:  Positive for abdominal pain. Negative for blood in stool, diarrhea, nausea and vomiting.   Neurological:  Positive for tremors and headaches.   Psychiatric/Behavioral:  Negative for hallucinations. The patient is nervous/anxious.         Past Medical and Surgical History:   No past medical history on file.    No past surgical history on file.    Meds/Allergies:  Prior to Admission medications    Not on File     I have reviewed home medications with patient personally.    Allergies:   Allergies   Allergen Reactions    Amoxicillin Hives       Social History:  Marital Status: Single   Patient Pre-hospital Living Situation: Home  Patient Pre-hospital Level of Mobility: walks  Patient Pre-hospital Diet Restrictions: none   Substance Use History:   Social History     Substance and Sexual Activity   Alcohol Use Yes    Alcohol/week: 4.0 standard drinks of alcohol    Types: 4 Shots of liquor per week     Social History     Tobacco Use   Smoking Status Never   Smokeless Tobacco Former     Social History     Substance and Sexual Activity   Drug Use Not Currently       Family History:  No family history on file.    Physical Exam:     Vitals:   Blood Pressure: 130/90 (02/10/24 0132)  Pulse: 100 (02/10/24  0132)  Temperature: 98.6 °F (37 °C) (02/10/24 0132)  Respirations: 16 (02/10/24 0132)  SpO2: 97 % (02/10/24 0132)    Physical Exam  Constitutional:       General: He is not in acute distress.     Appearance: He is diaphoretic. He is not ill-appearing or toxic-appearing.   HENT:      Mouth/Throat:      Mouth: Mucous membranes are dry.   Eyes:      General: No scleral icterus.  Cardiovascular:      Rate and Rhythm: Normal rate and regular rhythm.      Heart sounds: Normal heart sounds.   Pulmonary:      Breath sounds: Normal breath sounds.   Abdominal:      General: Abdomen is flat. Bowel sounds are normal. There is no distension.      Palpations: Abdomen is soft.      Tenderness: There is abdominal tenderness. There is no guarding.      Comments: RUQ TTP   Musculoskeletal:      Right lower leg: No edema.      Left lower leg: No edema.   Skin:     General: Skin is warm.   Neurological:      General: No focal deficit present.      Mental Status: He is alert and oriented to person, place, and time.   Psychiatric:         Mood and Affect: Mood normal.          Additional Data:     Lab Results:  Results from last 7 days   Lab Units 02/09/24  2158   WBC Thousand/uL 9.39   HEMOGLOBIN g/dL 16.7   HEMATOCRIT % 47.5   PLATELETS Thousands/uL 344   NEUTROS PCT % 80*   LYMPHS PCT % 11*   MONOS PCT % 9   EOS PCT % 0     Results from last 7 days   Lab Units 02/09/24  2158   SODIUM mmol/L 135   POTASSIUM mmol/L 3.5   CHLORIDE mmol/L 100   CO2 mmol/L 22   BUN mg/dL 6   CREATININE mg/dL 0.90   ANION GAP mmol/L 13   CALCIUM mg/dL 10.2   ALBUMIN g/dL 5.5*   TOTAL BILIRUBIN mg/dL 0.95   ALK PHOS U/L 101   ALT U/L 64*   AST U/L 44*   GLUCOSE RANDOM mg/dL 109                       Lines/Drains:  Invasive Devices       Peripheral Intravenous Line  Duration             Peripheral IV 02/09/24 Left;Proximal;Ventral (anterior) Forearm <1 day                        Imaging: No pertinent imaging reviewed.  US right upper quadrant    (Results  Pending)       EKG and Other Studies Reviewed on Admission:   EKG: No EKG obtained.    ** Please Note: This note has been constructed using a voice recognition system. **

## 2024-02-10 NOTE — DISCHARGE SUMMARY
Formerly [facility]  Discharge- Dagoberto Uribe 2000, 23 y.o. male MRN: [id number]  Unit/Bed#: S -01 Encounter: [id number]  Primary Care Provider: No primary care provider on file.   Date and time admitted to hospital: 2/9/2024  9:42 PM    Transaminitis-resolved as of 2/10/2024  Assessment & Plan  Mild transaminitis possibly 2/2 alcohol abuse   AST 44, ALT 64  Patient endorses intermittent RUQ abdominal pain x 3 days   Mild right upper quadrant pain.  Likely secondary drinking.  The AST and ALT were minimally elevated on admission but that did resolve.      * Alcohol withdrawal (HCC)-resolved as of 2/10/2024  Assessment & Plan  Patient presents with tremors, HA, diaphoresis, anxiety after drinking 5 shots of liquor daily for the past 6-7 months   Last alcoholic beverage was 02/07  No prior hx of alcohol withdrawal or seizures   Noted to have tachycardia and hypertension in the ED -- now improving s/p Valium and Phenobarbital in the ED   Symptoms significantly improved.  Continue with the vitamin supplements as an outpatient.  Will do a short course of Librium as well on discharge.  Patient seems to be doing okay.  He said he will follow-up as an outpatient for rehab if he feels it is necessary      Medical Problems       Resolved Problems  Date Reviewed: 2/10/2024            Resolved    * (Principal) Alcohol withdrawal (HCC) 2/10/2024     Resolved by  Scot Akers MD    Transaminitis 2/10/2024     Resolved by  Scot Akers MD        Discharging Physician / Practitioner: Scot Akers MD  PCP: No primary care provider on file.  Admission Date:   Admission Orders (From admission, onward)       Ordered        02/10/24 0042  Place in Observation  Once                          Discharge Date: 02/10/24    Consultations During Hospital Stay:  None    Procedures Performed:   None    Significant Findings / Test Results:   None    Incidental Findings:   None      Test Results Pending  at Discharge (will require follow up):   None     Outpatient Tests Requested:  Patient can schedule an outpatient ultrasound of the right upper quadrant    Complications: None    Reason for Admission: Withdrawal-like symptoms    Hospital Course:   Dagoberto Uribe is a 23 y.o. male patient who originally presented to the hospital on 2/9/2024 due to withdrawal-like symptoms    History presenting illness:Dagoberto Uribe is a 23 y.o. male with no past medical history who presents with tremors, HA, diaphoresis, anxiety after drinking 5 shots of liquor daily for the past 6-7 months. Last alcoholic beverage was 02/07. Prior to this, patient would drink only occasionally/socially at school. No prior hx of alcohol withdrawal or seizures. Additionally, the patient endorses intermittent RUQ pain with no nausea/vomiting or fever/chills. He states this pain started around the time he stopped drinking. Endorses decreases appetite. No prior surgeries. Does not smoke or use illicit drugs.      Hospital course: Patient admitted for above reasons.  When I saw him this morning the patient had absolutely no symptoms of withdrawal.  He did not have any tremors.  He was otherwise doing okay.  His pressures did improve.  Given that he is high risk for withdrawal over the next 24 hours and when to put him on a short course of Librium as an outpatient.  Continue vitamin supplements as an outpatient.  His mild transaminitis did improve.  Nothing else concerning in the lab work that he needs an inpatient ultrasound of the liver.  This can be done as an outpatient.  Patient not have a family doctor so I did provide him with [email or web address] link number to find one in the area.  Mother was at bedside.  We are otherwise okay for discharge.        Please see above list of diagnoses and related plan for additional information.     Condition at Discharge: good    Discharge Day Visit / Exam:   Subjective: Patient seen and examined.  Doing okay at this  time  Vitals: Blood Pressure: 134/85 (02/10/24 0812)  Pulse: 70 (02/10/24 0812)  Temperature: 97.9 °F (36.6 °C) (02/10/24 0812)  Respirations: 16 (02/10/24 0812)  SpO2: 97 % (02/10/24 0812)  Exam:   Physical Exam   General Appearance:    Alert, cooperative, no distress, appears stated age                               Lungs:     Clear to auscultation bilaterally, respirations unlabored       Heart:    Regular rate and rhythm, S1 and S2 normal, no murmur, rub    or gallop   Abdomen:     Soft, non-tender, bowel sounds active all four quadrants,     no masses, no organomegaly           Extremities:   Extremities normal, atraumatic, no cyanosis or edema                         Discussion with Family: Updated  (mother) at bedside.    Discharge instructions/Information to patient and family:   See after visit summary for information provided to patient and family.      Provisions for Follow-Up Care:  See after visit summary for information related to follow-up care and any pertinent home health orders.      Mobility at time of Discharge:   Basic Mobility Inpatient Raw Score: 24  JH-HLM Goal: 8: Walk 250 feet or more  JH-HLM Achieved: 8: Walk 250 feet ot more  HLM Goal achieved. Continue to encourage appropriate mobility.     Disposition:   Home    Planned Readmission: Not anticipated     Discharge Statement:  I spent 25 minutes discharging the patient. This time was spent on the day of discharge. I had direct contact with the patient on the day of discharge. Greater than 50% of the total time was spent examining patient, answering all patient questions, arranging and discussing plan of care with patient as well as directly providing post-discharge instructions.  Additional time then spent on discharge activities.    Discharge Medications:  See after visit summary for reconciled discharge medications provided to patient and/or family.      **Please Note: This note may have been constructed using a voice  recognition system**

## 2024-02-11 LAB
ATRIAL RATE: 93 BPM
P AXIS: 71 DEGREES
PR INTERVAL: 158 MS
QRS AXIS: 86 DEGREES
QRSD INTERVAL: 96 MS
QT INTERVAL: 346 MS
QTC INTERVAL: 430 MS
T WAVE AXIS: 29 DEGREES
VENTRICULAR RATE: 93 BPM

## 2024-02-12 VITALS
RESPIRATION RATE: 20 BRPM | TEMPERATURE: 98.5 F | WEIGHT: 196 LBS | DIASTOLIC BLOOD PRESSURE: 110 MMHG | SYSTOLIC BLOOD PRESSURE: 154 MMHG | HEART RATE: 110 BPM | OXYGEN SATURATION: 98 %

## 2024-02-16 ENCOUNTER — OFFICE VISIT (OUTPATIENT)
Dept: FAMILY MEDICINE CLINIC | Facility: CLINIC | Age: 24
End: 2024-02-16
Payer: COMMERCIAL

## 2024-02-16 VITALS
WEIGHT: 197 LBS | SYSTOLIC BLOOD PRESSURE: 120 MMHG | BODY MASS INDEX: 24.49 KG/M2 | RESPIRATION RATE: 17 BRPM | HEART RATE: 92 BPM | TEMPERATURE: 99.3 F | OXYGEN SATURATION: 98 % | HEIGHT: 75 IN | DIASTOLIC BLOOD PRESSURE: 80 MMHG

## 2024-02-16 DIAGNOSIS — Z00.00 ANNUAL PHYSICAL EXAM: Primary | ICD-10-CM

## 2024-02-16 DIAGNOSIS — R74.8 ELEVATED LIVER ENZYMES: ICD-10-CM

## 2024-02-16 PROCEDURE — 99385 PREV VISIT NEW AGE 18-39: CPT | Performed by: NURSE PRACTITIONER

## 2024-02-16 NOTE — PROGRESS NOTES
ADULT ANNUAL PHYSICAL  [facility]    NAME: Dagoberto Uribe  AGE: 23 y.o. SEX: male  : 2000     DATE: 2024     Assessment and Plan:     Problem List Items Addressed This Visit          Other    Elevated liver enzymes    Relevant Orders    US abdomen complete    Comprehensive metabolic panel     Other Visit Diagnoses       Annual physical exam    -  Primary            Immunizations and preventive care screenings were discussed with patient today. Appropriate education was printed on patient's after visit summary.    Counseling:  Alcohol/drug use: discussed moderation in alcohol intake, the recommendations for healthy alcohol use, and avoidance of illicit drug use.  Dental Health: discussed importance of regular tooth brushing, flossing, and dental visits.  Injury prevention: discussed safety/seat belts, safety helmets, smoke detectors, carbon dioxide detectors, and smoking near bedding or upholstery.  Sexual health: discussed sexually transmitted diseases, partner selection, use of condoms, avoidance of unintended pregnancy, and contraceptive alternatives.  Exercise: the importance of regular exercise/physical activity was discussed. Recommend exercise 3-5 times per week for at least 30 minutes.          Return in about 1 year (around 2025) for Annual physical.     Chief Complaint:     Chief Complaint   Patient presents with    Follow-up     New pt. To the practice. Pt is been seeing for a follow up from hospital(alcohol withdraws)      History of Present Illness:     Adult Annual Physical   Patient here for a comprehensive physical exam. The patient reports problems - was drinking alcohol daily, now stopped .    Diet and Physical Activity  Diet/Nutrition: well balanced diet.   Exercise: 3-4 times a week on average.      Depression Screening  PHQ-2/9 Depression Screening           General Health  Sleep: sleeps well.   Hearing: normal -  bilateral.  Vision: no vision problems.   Dental: regular dental visits and brushes teeth once daily.        Health  History of STDs?: no.    Advanced Care Planning  Do you have an advanced directive? no  Do you have a durable medical power of ? no  ACP document given to the patient? no     Review of Systems:     Review of Systems   Constitutional:  Negative for fatigue and fever.   HENT:  Negative for congestion, postnasal drip and rhinorrhea.    Eyes:  Negative for photophobia and visual disturbance.   Respiratory:  Negative for cough, shortness of breath and wheezing.    Cardiovascular:  Negative for chest pain and palpitations.   Gastrointestinal:  Negative for constipation, diarrhea, nausea and vomiting.   Genitourinary:  Negative for dysuria and frequency.   Musculoskeletal:  Negative for arthralgias and myalgias.   Skin:  Negative for rash.   Neurological:  Negative for dizziness, light-headedness and headaches.   Hematological:  Negative for adenopathy.   Psychiatric/Behavioral:  Negative for dysphoric mood and sleep disturbance. The patient is not nervous/anxious.       Past Medical History:     Past Medical History:   Diagnosis Date    No pertinent past medical history       Past Surgical History:     Past Surgical History:   Procedure Laterality Date    MOUTH SURGERY      wisdom teeth      Social History:     Social History     Socioeconomic History    Marital status: Single     Spouse name: None    Number of children: None    Years of education: None    Highest education level: None   Occupational History    None   Tobacco Use    Smoking status: Never    Smokeless tobacco: Former   Vaping Use    Vaping status: Former   Substance and Sexual Activity    Alcohol use: Not Currently     Alcohol/week: 4.0 standard drinks of alcohol     Types: 4 Shots of liquor per week     Comment: 8 shots of vodka per day    Drug use: Not Currently    Sexual activity: Not Currently   Other Topics Concern    None  "  Social History Narrative    None     Social Determinants of Health     Financial Resource Strain: Not on file   Food Insecurity: Not on file   Transportation Needs: Not on file   Physical Activity: Not on file   Stress: Not on file   Social Connections: Not on file   Intimate Partner Violence: Not on file   Housing Stability: Not on file      Family History:     Family History   Problem Relation Age of Onset    No Known Problems Mother     No Known Problems Father     No Known Problems Sister       Current Medications:     Current Outpatient Medications   Medication Sig Dispense Refill    folic acid (FOLVITE) 1 mg tablet Take 1 tablet (1 mg total) by mouth daily for 14 days      Multiple Vitamin (multivitamin) capsule Take 1 capsule by mouth daily      thiamine 100 MG tablet Take 1 tablet (100 mg total) by mouth daily for 14 days       No current facility-administered medications for this visit.      Allergies:     Allergies   Allergen Reactions    Amoxicillin Hives      Physical Exam:     /80 (BP Location: Right arm, Patient Position: Sitting, Cuff Size: Standard)   Pulse 92   Temp 99.3 °F (37.4 °C) (Tympanic)   Resp 17   Ht 6' 2.96\" (1.904 m)   Wt 89.4 kg (197 lb)   SpO2 98%   BMI 24.65 kg/m²     Physical Exam  Vitals and nursing note reviewed.   Constitutional:       Appearance: Normal appearance.   HENT:      Head: Normocephalic and atraumatic.      Right Ear: Tympanic membrane, ear canal and external ear normal.      Left Ear: Tympanic membrane, ear canal and external ear normal.      Nose: Nose normal.      Mouth/Throat:      Mouth: Mucous membranes are moist.   Eyes:      Conjunctiva/sclera: Conjunctivae normal.   Cardiovascular:      Rate and Rhythm: Normal rate and regular rhythm.      Heart sounds: Normal heart sounds.   Pulmonary:      Effort: Pulmonary effort is normal.      Breath sounds: Normal breath sounds.   Abdominal:      General: Bowel sounds are normal.   Musculoskeletal:         " General: Normal range of motion.      Cervical back: Normal range of motion and neck supple.   Skin:     General: Skin is warm and dry.      Capillary Refill: Capillary refill takes less than 2 seconds.   Neurological:      General: No focal deficit present.      Mental Status: He is alert and oriented to person, place, and time.   Psychiatric:         Mood and Affect: Mood normal.         Behavior: Behavior normal.         Thought Content: Thought content normal.         Judgment: Judgment normal.          DEUCE Talley [facility]

## 2024-04-11 ENCOUNTER — APPOINTMENT (OUTPATIENT)
Dept: LAB | Facility: CLINIC | Age: 24
End: 2024-04-11
Payer: COMMERCIAL

## 2024-04-11 DIAGNOSIS — R74.8 ELEVATED LIVER ENZYMES: ICD-10-CM

## 2024-04-11 LAB
ALBUMIN SERPL BCP-MCNC: 4.8 G/DL (ref 3.5–5)
ALP SERPL-CCNC: 100 U/L (ref 34–104)
ALT SERPL W P-5'-P-CCNC: 17 U/L (ref 7–52)
ANION GAP SERPL CALCULATED.3IONS-SCNC: 6 MMOL/L (ref 4–13)
AST SERPL W P-5'-P-CCNC: 13 U/L (ref 13–39)
BILIRUB SERPL-MCNC: 1.08 MG/DL (ref 0.2–1)
BUN SERPL-MCNC: 9 MG/DL (ref 5–25)
CALCIUM SERPL-MCNC: 9.5 MG/DL (ref 8.4–10.2)
CHLORIDE SERPL-SCNC: 104 MMOL/L (ref 96–108)
CO2 SERPL-SCNC: 26 MMOL/L (ref 21–32)
CREAT SERPL-MCNC: 1.03 MG/DL (ref 0.6–1.3)
GFR SERPL CREATININE-BSD FRML MDRD: 102 ML/MIN/1.73SQ M
GLUCOSE P FAST SERPL-MCNC: 157 MG/DL (ref 65–99)
POTASSIUM SERPL-SCNC: 4 MMOL/L (ref 3.5–5.3)
PROT SERPL-MCNC: 7.3 G/DL (ref 6.4–8.4)
SODIUM SERPL-SCNC: 136 MMOL/L (ref 135–147)

## 2024-04-11 PROCEDURE — 36415 COLL VENOUS BLD VENIPUNCTURE: CPT

## 2024-04-11 PROCEDURE — 80053 COMPREHEN METABOLIC PANEL: CPT

## 2024-04-12 ENCOUNTER — HOSPITAL ENCOUNTER (OUTPATIENT)
Dept: ULTRASOUND IMAGING | Facility: HOSPITAL | Age: 24
Discharge: HOME | End: 2024-04-12
Payer: COMMERCIAL

## 2024-04-12 DIAGNOSIS — R74.8 ELEVATED LIVER ENZYMES: ICD-10-CM

## 2024-04-12 PROCEDURE — 76705 ECHO EXAM OF ABDOMEN: CPT

## 2024-07-09 ENCOUNTER — APPOINTMENT (OUTPATIENT)
Dept: LAB | Facility: CLINIC | Age: 24
End: 2024-07-09
Payer: COMMERCIAL

## 2024-07-09 ENCOUNTER — OFFICE VISIT (OUTPATIENT)
Dept: FAMILY MEDICINE CLINIC | Facility: CLINIC | Age: 24
End: 2024-07-09
Payer: COMMERCIAL

## 2024-07-09 VITALS
OXYGEN SATURATION: 99 % | HEIGHT: 75 IN | WEIGHT: 166 LBS | TEMPERATURE: 97.8 F | RESPIRATION RATE: 17 BRPM | HEART RATE: 110 BPM | BODY MASS INDEX: 20.64 KG/M2 | SYSTOLIC BLOOD PRESSURE: 120 MMHG | DIASTOLIC BLOOD PRESSURE: 80 MMHG

## 2024-07-09 DIAGNOSIS — J02.9 SORE THROAT: ICD-10-CM

## 2024-07-09 DIAGNOSIS — Z00.6 ENCOUNTER FOR EXAMINATION FOR NORMAL COMPARISON OR CONTROL IN CLINICAL RESEARCH PROGRAM: ICD-10-CM

## 2024-07-09 DIAGNOSIS — R53.83 OTHER FATIGUE: ICD-10-CM

## 2024-07-09 DIAGNOSIS — J02.9 SORE THROAT: Primary | ICD-10-CM

## 2024-07-09 PROBLEM — R74.8 ELEVATED LIVER ENZYMES: Status: RESOLVED | Noted: 2024-02-16 | Resolved: 2024-07-09

## 2024-07-09 LAB
ALBUMIN SERPL BCG-MCNC: 5 G/DL (ref 3.5–5)
ALP SERPL-CCNC: 78 U/L (ref 34–104)
ALT SERPL W P-5'-P-CCNC: 47 U/L (ref 7–52)
ANION GAP SERPL CALCULATED.3IONS-SCNC: 7 MMOL/L (ref 4–13)
AST SERPL W P-5'-P-CCNC: 21 U/L (ref 13–39)
B BURGDOR IGG+IGM SER QL IA: NEGATIVE
BASOPHILS # BLD AUTO: 0.03 THOUSANDS/ÂΜL (ref 0–0.1)
BASOPHILS NFR BLD AUTO: 0 % (ref 0–1)
BILIRUB SERPL-MCNC: 0.9 MG/DL (ref 0.2–1)
BUN SERPL-MCNC: 10 MG/DL (ref 5–25)
CALCIUM SERPL-MCNC: 10 MG/DL (ref 8.4–10.2)
CHLORIDE SERPL-SCNC: 104 MMOL/L (ref 96–108)
CO2 SERPL-SCNC: 28 MMOL/L (ref 21–32)
CREAT SERPL-MCNC: 1.04 MG/DL (ref 0.6–1.3)
EOSINOPHIL # BLD AUTO: 0.01 THOUSAND/ÂΜL (ref 0–0.61)
EOSINOPHIL NFR BLD AUTO: 0 % (ref 0–6)
ERYTHROCYTE [DISTWIDTH] IN BLOOD BY AUTOMATED COUNT: 11.7 % (ref 11.6–15.1)
GFR SERPL CREATININE-BSD FRML MDRD: 101 ML/MIN/1.73SQ M
GLUCOSE SERPL-MCNC: 99 MG/DL (ref 65–140)
HCT VFR BLD AUTO: 44.7 % (ref 36.5–49.3)
HGB BLD-MCNC: 15.3 G/DL (ref 12–17)
IMM GRANULOCYTES # BLD AUTO: 0.02 THOUSAND/UL (ref 0–0.2)
IMM GRANULOCYTES NFR BLD AUTO: 0 % (ref 0–2)
LYMPHOCYTES # BLD AUTO: 0.88 THOUSANDS/ÂΜL (ref 0.6–4.47)
LYMPHOCYTES NFR BLD AUTO: 11 % (ref 14–44)
MCH RBC QN AUTO: 31.5 PG (ref 26.8–34.3)
MCHC RBC AUTO-ENTMCNC: 34.2 G/DL (ref 31.4–37.4)
MCV RBC AUTO: 92 FL (ref 82–98)
MONOCYTES # BLD AUTO: 0.6 THOUSAND/ÂΜL (ref 0.17–1.22)
MONOCYTES NFR BLD AUTO: 7 % (ref 4–12)
NEUTROPHILS # BLD AUTO: 6.61 THOUSANDS/ÂΜL (ref 1.85–7.62)
NEUTS SEG NFR BLD AUTO: 82 % (ref 43–75)
NRBC BLD AUTO-RTO: 0 /100 WBCS
PLATELET # BLD AUTO: 309 THOUSANDS/UL (ref 149–390)
PMV BLD AUTO: 9.4 FL (ref 8.9–12.7)
POTASSIUM SERPL-SCNC: 4.1 MMOL/L (ref 3.5–5.3)
PROT SERPL-MCNC: 7.6 G/DL (ref 6.4–8.4)
RBC # BLD AUTO: 4.85 MILLION/UL (ref 3.88–5.62)
S PYO AG THROAT QL: NEGATIVE
SODIUM SERPL-SCNC: 139 MMOL/L (ref 135–147)
WBC # BLD AUTO: 8.15 THOUSAND/UL (ref 4.31–10.16)

## 2024-07-09 PROCEDURE — 86308 HETEROPHILE ANTIBODY SCREEN: CPT

## 2024-07-09 PROCEDURE — 99213 OFFICE O/P EST LOW 20 MIN: CPT | Performed by: NURSE PRACTITIONER

## 2024-07-09 PROCEDURE — 80053 COMPREHEN METABOLIC PANEL: CPT

## 2024-07-09 PROCEDURE — 86618 LYME DISEASE ANTIBODY: CPT

## 2024-07-09 PROCEDURE — 87070 CULTURE OTHR SPECIMN AEROBIC: CPT | Performed by: NURSE PRACTITIONER

## 2024-07-09 PROCEDURE — 85025 COMPLETE CBC W/AUTO DIFF WBC: CPT

## 2024-07-09 PROCEDURE — 36415 COLL VENOUS BLD VENIPUNCTURE: CPT

## 2024-07-09 PROCEDURE — 87880 STREP A ASSAY W/OPTIC: CPT | Performed by: NURSE PRACTITIONER

## 2024-07-09 RX ORDER — AZITHROMYCIN 250 MG/1
TABLET, FILM COATED ORAL
Qty: 6 TABLET | Refills: 0 | Status: SHIPPED | OUTPATIENT
Start: 2024-07-09 | End: 2024-07-13

## 2024-07-09 NOTE — PROGRESS NOTES
FAMILY PRACTICE OFFICE VISIT       NAME: Dagoberto Uribe  AGE: 23 y.o. SEX: male       : 2000        MRN: [id number]    DATE: 2024  TIME: 1:33 PM    Assessment and Plan   1. Sore throat  -     POCT rapid ANTIGEN strepA  -     Mononucleosis screen; Future  -     Lyme Total AB W Reflex to IGM/IGG; Future  -     CBC and differential; Future  -     Comprehensive metabolic panel; Future  -     Throat culture; Future  -     azithromycin (ZITHROMAX) 250 mg tablet; Take 2 tablets today then 1 tablet daily x 4 days  -     Throat culture  2. Other fatigue  -     Mononucleosis screen; Future  -     Lyme Total AB W Reflex to IGM/IGG; Future  -     CBC and differential; Future  -     Comprehensive metabolic panel; Future       There are no Patient Instructions on file for this visit.    Recent Results (from the past 168 hour(s))   POCT rapid ANTIGEN strepA    Collection Time: 24 12:30 PM   Result Value Ref Range     RAPID STREP A Negative Negative         Chief Complaint     Chief Complaint   Patient presents with    Sore Throat     Pt being seen for sore throat, headaches,fever for 2 days       History of Present Illness   Dagoberto Uribe is a 23 y.o.-year-old male who is here for illness  Started  with illness  Right side of throat sore  Right lung sore and breathing shallow  Headache  2 negative covid test  Ill for over one week  No ha  No ear pain  Fever for one to two days  Feeling tired and sluggish  Never dx with mono    Golfing a lot and worried about lyme    Review of Systems   Review of Systems   Constitutional:  Positive for fatigue and fever.   HENT:  Positive for congestion, postnasal drip, rhinorrhea and sore throat. Negative for ear pain.    Eyes:  Negative for photophobia and visual disturbance.   Respiratory:  Positive for cough.    Cardiovascular:  Negative for chest pain and palpitations.   Gastrointestinal:  Negative for constipation, diarrhea, nausea and vomiting.   Genitourinary:   Negative for dysuria and frequency.   Musculoskeletal:  Positive for myalgias. Negative for arthralgias.   Skin:  Negative for rash.   Neurological:  Positive for headaches.   Hematological:  Negative for adenopathy.   Psychiatric/Behavioral:  Negative for dysphoric mood and sleep disturbance. The patient is not nervous/anxious.        Active Problem List     Patient Active Problem List   Diagnosis   (none) - all problems resolved or deleted         Past Medical History:  Past Medical History:   Diagnosis Date    No pertinent past medical history        Past Surgical History:  Past Surgical History:   Procedure Laterality Date    MOUTH SURGERY      wisdom teeth       Family History:  Family History   Problem Relation Age of Onset    No Known Problems Mother     No Known Problems Father     No Known Problems Sister        Social History:  Social History     Socioeconomic History    Marital status: Single     Spouse name: Not on file    Number of children: Not on file    Years of education: Not on file    Highest education level: Not on file   Occupational History    Not on file   Tobacco Use    Smoking status: Never    Smokeless tobacco: Former   Vaping Use    Vaping status: Former   Substance and Sexual Activity    Alcohol use: Not Currently     Alcohol/week: 4.0 standard drinks of alcohol     Types: 4 Shots of liquor per week     Comment: 8 shots of vodka per day    Drug use: Not Currently    Sexual activity: Not Currently   Other Topics Concern    Not on file   Social History Narrative    Not on file     Social Determinants of Health     Financial Resource Strain: Not on file   Food Insecurity: Not on file   Transportation Needs: Not on file   Physical Activity: Not on file   Stress: Not on file   Social Connections: Not on file   Intimate Partner Violence: Not on file   Housing Stability: Not on file       Objective     Vitals:    07/09/24 1143   BP: 120/80   Pulse: (!) 110   Resp: 17   Temp: 97.8 °F (36.6 °C)  "  SpO2: 99%     Wt Readings from Last 3 Encounters:   07/09/24 75.3 kg (166 lb)   02/16/24 89.4 kg (197 lb)   02/09/24 88.9 kg (196 lb)       Physical Exam  Vitals and nursing note reviewed.   Constitutional:       Appearance: Normal appearance.   HENT:      Head: Normocephalic and atraumatic.      Right Ear: Tympanic membrane, ear canal and external ear normal.      Left Ear: Tympanic membrane, ear canal and external ear normal.      Nose: Congestion and rhinorrhea present.      Mouth/Throat:      Pharynx: Posterior oropharyngeal erythema present.   Eyes:      Conjunctiva/sclera: Conjunctivae normal.   Cardiovascular:      Rate and Rhythm: Regular rhythm.      Pulses: Normal pulses.      Heart sounds: Normal heart sounds.   Pulmonary:      Effort: Pulmonary effort is normal.      Breath sounds: Normal breath sounds.   Abdominal:      General: Bowel sounds are normal.   Musculoskeletal:         General: Normal range of motion.      Cervical back: Normal range of motion and neck supple.   Skin:     General: Skin is warm.      Capillary Refill: Capillary refill takes less than 2 seconds.   Neurological:      General: No focal deficit present.      Mental Status: He is alert and oriented to person, place, and time.   Psychiatric:         Mood and Affect: Mood normal.         Behavior: Behavior normal.         Pertinent Laboratory/Diagnostic Studies:  Lab Results   Component Value Date    BUN 9 04/11/2024    CREATININE 1.03 04/11/2024    CALCIUM 9.5 04/11/2024    K 4.0 04/11/2024    CO2 26 04/11/2024     04/11/2024     Lab Results   Component Value Date    ALT 17 04/11/2024    AST 13 04/11/2024    ALKPHOS 100 04/11/2024       Lab Results   Component Value Date    WBC 6.95 02/10/2024    HGB 14.4 02/10/2024    HCT 42.4 02/10/2024    MCV 96 02/10/2024     02/10/2024       No results found for: \"TSH\"    No results found for: \"CHOL\"  No results found for: \"TRIG\"  No results found for: \"HDL\"  No results found " "for: \"LDLCALC\"  No results found for: \"HGBA1C\"    Results for orders placed or performed in visit on 07/09/24   POCT rapid ANTIGEN strepA   Result Value Ref Range     RAPID STREP A Negative Negative       Orders Placed This Encounter   Procedures    Throat culture    Mononucleosis screen    Lyme Total AB W Reflex to IGM/IGG    CBC and differential    Comprehensive metabolic panel    POCT rapid ANTIGEN strepA       ALLERGIES:  Allergies   Allergen Reactions    Amoxicillin Hives       Current Medications     Current Outpatient Medications   Medication Sig Dispense Refill    azithromycin (ZITHROMAX) 250 mg tablet Take 2 tablets today then 1 tablet daily x 4 days 6 tablet 0    Multiple Vitamin (multivitamin) capsule Take 1 capsule by mouth daily      folic acid (FOLVITE) 1 mg tablet Take 1 tablet (1 mg total) by mouth daily for 14 days      thiamine 100 MG tablet Take 1 tablet (100 mg total) by mouth daily for 14 days       No current facility-administered medications for this visit.         Health Maintenance     Health Maintenance   Topic Date Due    HPV Vaccine (1 - Male 3-dose series) Never done    DTaP,Tdap,and Td Vaccines (1 - Tdap) Never done    COVID-19 Vaccine (1 - 2023-24 season) Never done    Influenza Vaccine (1) 09/01/2024    HIV Screening  02/16/2026 (Originally 7/17/2016)    Depression Screening  02/09/2025    Annual Physical  02/16/2025    Zoster Vaccine (1 of 2) 07/17/2051    RSV Vaccine Age 60+ Years (1 - 1-dose 60+ series) 07/17/2061    Hepatitis C Screening  Completed    RSV Vaccine age 0-20 Months  Aged Out    Pneumococcal Vaccine: Pediatrics (0 to 5 Years) and At-Risk Patients (6 to 64 Years)  Aged Out    HIB Vaccine  Aged Out    IPV Vaccine  Aged Out    Hepatitis A Vaccine  Aged Out    Meningococcal ACWY Vaccine  Aged Out     Immunization History   Administered Date(s) Administered    H1N1, All Formulations 10/28/2009    INFLUENZA 12/17/2012, 10/06/2018          DEUCE Talley    "

## 2024-07-10 ENCOUNTER — PATIENT MESSAGE (OUTPATIENT)
Dept: FAMILY MEDICINE CLINIC | Facility: CLINIC | Age: 24
End: 2024-07-10

## 2024-07-10 LAB — HETEROPH AB SER QL: NEGATIVE

## 2024-07-11 LAB — BACTERIA THROAT CULT: NORMAL

## 2024-07-12 ENCOUNTER — APPOINTMENT (OUTPATIENT)
Dept: RADIOLOGY | Age: 24
End: 2024-07-12
Payer: COMMERCIAL

## 2024-07-12 ENCOUNTER — OFFICE VISIT (OUTPATIENT)
Dept: URGENT CARE | Age: 24
End: 2024-07-12
Payer: COMMERCIAL

## 2024-07-12 VITALS
RESPIRATION RATE: 20 BRPM | OXYGEN SATURATION: 99 % | HEART RATE: 118 BPM | TEMPERATURE: 97.8 F | SYSTOLIC BLOOD PRESSURE: 123 MMHG | DIASTOLIC BLOOD PRESSURE: 68 MMHG

## 2024-07-12 DIAGNOSIS — J06.9 ACUTE URI: Primary | ICD-10-CM

## 2024-07-12 DIAGNOSIS — R06.02 SHORTNESS OF BREATH: ICD-10-CM

## 2024-07-12 LAB
ATRIAL RATE: 83 BPM
P AXIS: 76 DEGREES
PR INTERVAL: 154 MS
QRS AXIS: 80 DEGREES
QRSD INTERVAL: 106 MS
QT INTERVAL: 354 MS
QTC INTERVAL: 415 MS
T WAVE AXIS: 27 DEGREES
VENTRICULAR RATE: 83 BPM

## 2024-07-12 PROCEDURE — 71046 X-RAY EXAM CHEST 2 VIEWS: CPT

## 2024-07-12 PROCEDURE — 99213 OFFICE O/P EST LOW 20 MIN: CPT | Performed by: STUDENT IN AN ORGANIZED HEALTH CARE EDUCATION/TRAINING PROGRAM

## 2024-07-12 PROCEDURE — 93005 ELECTROCARDIOGRAM TRACING: CPT | Performed by: STUDENT IN AN ORGANIZED HEALTH CARE EDUCATION/TRAINING PROGRAM

## 2024-07-12 PROCEDURE — 93010 ELECTROCARDIOGRAM REPORT: CPT | Performed by: INTERNAL MEDICINE

## 2024-07-12 NOTE — PROGRESS NOTES
[facility]        NAME: Dagoberto Uribe is a 23 y.o. male  : 2000    MRN: [id number]  DATE: 2024  TIME: 3:23 PM    Assessment and Plan   Acute URI [J06.9]  1. Acute URI        2. Shortness of breath  XR chest pa & lateral      EKG NSR, 83 bpm, no ischemic changes on my read  Chest x-ray without acute abnormality on my read, pending radiology read.  Exam is reassuring today.  To continue azithromycin per PCP, further patient instructions as below.      Patient Instructions   I do not see any areas of concern on your EKG nor on your chest x-ray.  Your chest x-ray will also be read by the radiologist, if there are any changes to our plan, we will give you a call.  You can also follow results in your MyChart.  You may call with any questions.  You still do have some redness in your throat, it looks like you are either fighting off a bacterial illness that is being treated by the azithromycin or a viral upper respiratory infection.  You may need some more time to fully recover.  Please focus on good hydration, be sure that you are getting frequent small meals as your body needs good nutrition to fight off an illness.  You may take Tylenol or Motrin if you are feeling pain.  Follow-up with your PCP if your symptoms are not fully resolving over the coming week.  If you have any severe symptoms such as worsening shortness of breath, please go to the ER.      Chief Complaint     Chief Complaint   Patient presents with    Chest Pain     Reports sternal more to left side chest pain intermittent.  For the past 3-4 days. Patient states more of discomfort. Has not bee feeling well was evaluated by pcp 23 for right side john pain , facial swelling and , right side nasal congestion for the past 2 weeks. Did see pcp who orders blood work , for lyme, Mono and CMP , CBC. Sternal pain, right flank pain worsens in Am and Pm with lying down and sitting.     Shortness of Breath         History of Present  Illness       Patient presents for evaluation of symptoms that started on 7/3.  He started with generalized fatigue and malaise.  He then developed right-sided nasal/sinus congestion, right-sided sore throat.  He also feels as though his right lung is more labored when he is breathing which makes him feel somewhat short of breath.  He has felt occasional pain radiating into his upper right abdomen.  He was seen by his PCP on 7/9, mono and Lyme were negative.  CBC and CMP were unremarkable.  Overall, since starting azithromycin 2 days ago prescribed by his PCP he feels that he is improving with some of his symptoms including improvement in headache and mental state, some improvement in his congestion/sore throat though he still is congested on the right side.  He is still feeling discomfort and difficulty on the right side with breathing as well as a more constant central discomfort in his chest.  Since starting the antibiotic, his appetite is improving, he has not eaten today as he was feeling somewhat anxious, he has been keeping up with fluids.  No fevers, chills.        Review of Systems   Review of Systems   All other systems reviewed and are negative.        Current Medications       Current Outpatient Medications:     azithromycin (ZITHROMAX) 250 mg tablet, Take 2 tablets today then 1 tablet daily x 4 days, Disp: 6 tablet, Rfl: 0    Multiple Vitamin (multivitamin) capsule, Take 1 capsule by mouth daily, Disp: , Rfl:     folic acid (FOLVITE) 1 mg tablet, Take 1 tablet (1 mg total) by mouth daily for 14 days, Disp: , Rfl:     thiamine 100 MG tablet, Take 1 tablet (100 mg total) by mouth daily for 14 days, Disp: , Rfl:     Current Allergies     Allergies as of 07/12/2024 - Reviewed 07/12/2024   Allergen Reaction Noted    Amoxicillin Hives 02/09/2024            The following portions of the patient's history were reviewed and updated as appropriate: allergies, current medications, past family history, past medical  history, past social history, past surgical history and problem list.     Past Medical History:   Diagnosis Date    No pertinent past medical history        Past Surgical History:   Procedure Laterality Date    MOUTH SURGERY      wisdom teeth       Family History   Problem Relation Age of Onset    No Known Problems Mother     No Known Problems Father     No Known Problems Sister          Medications have been verified.        Objective   /68   Pulse (!) 118   Temp 97.8 °F (36.6 °C)   Resp 20   SpO2 99%   No LMP for male patient.       Physical Exam     Physical Exam  Vitals and nursing note reviewed.   Constitutional:       General: He is not in acute distress.     Appearance: Normal appearance. He is not ill-appearing or toxic-appearing.   HENT:      Head: Normocephalic and atraumatic.      Right Ear: External ear normal.      Left Ear: External ear normal.      Nose: Nose normal.      Mouth/Throat:      Mouth: Mucous membranes are moist.      Pharynx: Posterior oropharyngeal erythema present.      Tonsils: 2+ on the right. 1+ on the left.      Comments: Small exudate vs tonsil stone on R  Eyes:      Extraocular Movements: Extraocular movements intact.   Cardiovascular:      Rate and Rhythm: Normal rate and regular rhythm.      Heart sounds: Normal heart sounds.   Pulmonary:      Effort: Pulmonary effort is normal. No respiratory distress.      Breath sounds: Normal breath sounds. No stridor. No wheezing, rhonchi or rales.   Musculoskeletal:         General: No swelling, tenderness or deformity.      Right lower leg: No edema.      Left lower leg: No edema.   Skin:     General: Skin is warm and dry.      Capillary Refill: Capillary refill takes less than 2 seconds.      Findings: No rash.   Neurological:      Mental Status: He is alert.      Gait: Gait normal.   Psychiatric:         Behavior: Behavior normal.

## 2024-07-12 NOTE — PATIENT INSTRUCTIONS
I do not see any areas of concern on your EKG nor on your chest x-ray.  Your chest x-ray will also be read by the radiologist, if there are any changes to our plan, we will give you a call.  You can also follow results in your MyChart.  You may call with any questions.  You still do have some redness in your throat, it looks like you are either fighting off a bacterial illness that is being treated by the azithromycin or a viral upper respiratory infection.  You may need some more time to fully recover.  Please focus on good hydration, be sure that you are getting frequent small meals as your body needs good nutrition to fight off an illness.  You may take Tylenol or Motrin if you are feeling pain.  Follow-up with your PCP if your symptoms are not fully resolving over the coming week.  If you have any severe symptoms such as worsening shortness of breath, please go to the ER.

## 2024-07-19 LAB
APOB+LDLR+PCSK9 GENE MUT ANL BLD/T: NOT DETECTED
BRCA1+BRCA2 DEL+DUP + FULL MUT ANL BLD/T: NOT DETECTED
MLH1+MSH2+MSH6+PMS2 GN DEL+DUP+FUL M: NOT DETECTED

## 2024-07-29 ENCOUNTER — TELEPHONE (OUTPATIENT)
Dept: FAMILY MEDICINE CLINIC | Facility: CLINIC | Age: 24
End: 2024-07-29

## 2024-08-23 ENCOUNTER — OFFICE VISIT (OUTPATIENT)
Dept: FAMILY MEDICINE CLINIC | Facility: CLINIC | Age: 24
End: 2024-08-23
Payer: COMMERCIAL

## 2024-08-23 VITALS
DIASTOLIC BLOOD PRESSURE: 86 MMHG | BODY MASS INDEX: 20.64 KG/M2 | TEMPERATURE: 98.8 F | HEIGHT: 75 IN | HEART RATE: 94 BPM | OXYGEN SATURATION: 97 % | WEIGHT: 166 LBS | RESPIRATION RATE: 17 BRPM | SYSTOLIC BLOOD PRESSURE: 120 MMHG

## 2024-08-23 DIAGNOSIS — B35.3 TINEA PEDIS OF LEFT FOOT: Primary | ICD-10-CM

## 2024-08-23 PROCEDURE — 99213 OFFICE O/P EST LOW 20 MIN: CPT | Performed by: NURSE PRACTITIONER

## 2024-08-23 RX ORDER — KETOCONAZOLE 20 MG/G
CREAM TOPICAL DAILY
Qty: 60 G | Refills: 1 | Status: SHIPPED | OUTPATIENT
Start: 2024-08-23

## 2024-08-23 NOTE — PROGRESS NOTES
FAMILY PRACTICE OFFICE VISIT       NAME: Dagoberto Uribe  AGE: 24 y.o. SEX: adult       : 2000        MRN: [id number]    DATE: 2024  TIME: 2:42 PM    Assessment and Plan   1. Tinea pedis of left foot  -     ketoconazole (NIZORAL) 2 % cream; Apply topically daily       There are no Patient Instructions on file for this visit.        Chief Complaint     Chief Complaint   Patient presents with    Foot Injury     Pt being seen for possible foot infection/rash       History of Present Illness   Dagoberto Uribe is a 24 y.o.-year-old adult who is here for rash on toes of left foot  Noticed it around one to two weeks ago  Used no meds      Review of Systems   Review of Systems   Constitutional:  Negative for fatigue and fever.   Respiratory:  Negative for cough, shortness of breath and wheezing.    Cardiovascular:  Negative for chest pain and palpitations.   Genitourinary:  Negative for dysuria and frequency.   Musculoskeletal:  Negative for gait problem and myalgias.   Skin:  Positive for rash.   Neurological:  Negative for dizziness, light-headedness and headaches.   Hematological:  Negative for adenopathy.   Psychiatric/Behavioral:  Negative for dysphoric mood and sleep disturbance.        Active Problem List     Patient Active Problem List   Diagnosis    Tinea pedis of left foot         Past Medical History:  Past Medical History:   Diagnosis Date    No pertinent past medical history        Past Surgical History:  Past Surgical History:   Procedure Laterality Date    MOUTH SURGERY      wisdom teeth       Family History:  Family History   Problem Relation Age of Onset    No Known Problems Mother     No Known Problems Father     No Known Problems Sister        Social History:  Social History     Socioeconomic History    Marital status: Single     Spouse name: Not on file    Number of children: Not on file    Years of education: Not on file    Highest education level: Not on file   Occupational History    Not on  file   Tobacco Use    Smoking status: Never    Smokeless tobacco: Former   Vaping Use    Vaping status: Former   Substance and Sexual Activity    Alcohol use: Not Currently     Alcohol/week: 4.0 standard drinks of alcohol     Types: 4 Shots of liquor per week     Comment: 8 shots of vodka per day    Drug use: Not Currently    Sexual activity: Not Currently   Other Topics Concern    Not on file   Social History Narrative    Not on file     Social Determinants of Health     Financial Resource Strain: Not on file   Food Insecurity: Not on file   Transportation Needs: Not on file   Physical Activity: Not on file   Stress: Not on file   Social Connections: Not on file   Intimate Partner Violence: Not on file   Housing Stability: Not on file       Objective     Vitals:    08/23/24 1543   BP: 120/86   Pulse: 94   Resp: 17   Temp: 98.8 °F (37.1 °C)   SpO2: 97%     Wt Readings from Last 3 Encounters:   08/23/24 75.3 kg (166 lb)   07/09/24 75.3 kg (166 lb)   02/16/24 89.4 kg (197 lb)       Physical Exam  Vitals and nursing note reviewed.   Constitutional:       Appearance: Normal appearance.   HENT:      Head: Normocephalic and atraumatic.   Eyes:      Conjunctiva/sclera: Conjunctivae normal.   Cardiovascular:      Rate and Rhythm: Normal rate and regular rhythm.      Heart sounds: Normal heart sounds.   Pulmonary:      Effort: Pulmonary effort is normal.      Breath sounds: Normal breath sounds.   Musculoskeletal:         General: Normal range of motion.   Skin:     General: Skin is warm and dry.      Capillary Refill: Capillary refill takes less than 2 seconds.      Findings: Rash present.             Comments: Dry scaly tissue between toes     Neurological:      Mental Status: He is alert and oriented to person, place, and time.   Psychiatric:         Mood and Affect: Mood normal.         Behavior: Behavior normal.         Pertinent Laboratory/Diagnostic Studies:  Lab Results   Component Value Date    BUN 10 07/09/2024     "CREATININE 1.04 07/09/2024    CALCIUM 10.0 07/09/2024    K 4.1 07/09/2024    CO2 28 07/09/2024     07/09/2024     Lab Results   Component Value Date    ALT 47 07/09/2024    AST 21 07/09/2024    ALKPHOS 78 07/09/2024       Lab Results   Component Value Date    WBC 8.15 07/09/2024    HGB 15.3 07/09/2024    HCT 44.7 07/09/2024    MCV 92 07/09/2024     07/09/2024       No results found for: \"TSH\"    No results found for: \"CHOL\"  No results found for: \"TRIG\"  No results found for: \"HDL\"  No results found for: \"LDLCALC\"  No results found for: \"HGBA1C\"    Results for orders placed or performed in visit on 07/12/24   ECG 12 lead   Result Value Ref Range    Ventricular Rate 83 BPM    Atrial Rate 83 BPM    LA Interval 154 ms    QRSD Interval 106 ms    QT Interval 354 ms    QTC Interval 415 ms    P Axis 76 degrees    QRS Axis 80 degrees    T Wave Axis 27 degrees       No orders of the defined types were placed in this encounter.      ALLERGIES:  Allergies   Allergen Reactions    Amoxicillin Hives       Current Medications     Current Outpatient Medications   Medication Sig Dispense Refill    ketoconazole (NIZORAL) 2 % cream Apply topically daily 60 g 1    Multiple Vitamin (multivitamin) capsule Take 1 capsule by mouth daily      folic acid (FOLVITE) 1 mg tablet Take 1 tablet (1 mg total) by mouth daily for 14 days      thiamine 100 MG tablet Take 1 tablet (100 mg total) by mouth daily for 14 days       No current facility-administered medications for this visit.         Health Maintenance     Health Maintenance   Topic Date Due    HPV Vaccine (1 - 3-dose series) Never done    DTaP,Tdap,and Td Vaccines (1 - Tdap) Never done    COVID-19 Vaccine (1 - 2023-24 season) Never done    Influenza Vaccine (1) 09/01/2024    HIV Screening  02/16/2026 (Originally 7/17/2016)    Annual Physical  02/16/2025    Depression Screening  08/23/2025    Zoster Vaccine (1 of 2) 07/17/2051    RSV Vaccine Age 60+ Years (1 - 1-dose 60+ " series) 07/17/2061    Hepatitis C Screening  Completed    RSV Vaccine age 0-20 Months  Aged Out    Pneumococcal Vaccine: Pediatrics (0 to 5 Years) and At-Risk Patients (6 to 64 Years)  Aged Out    HIB Vaccine  Aged Out    IPV Vaccine  Aged Out    Hepatitis A Vaccine  Aged Out    Meningococcal ACWY Vaccine  Aged Out     Immunization History   Administered Date(s) Administered    H1N1, All Formulations 10/28/2009    INFLUENZA 12/17/2012, 10/06/2018       Depression Screening and Follow-up Plan: Patient was screened for depression during today's encounter. They screened negative with a PHQ-2 score of 0.        DEUCE Talley